# Patient Record
Sex: MALE | Race: WHITE | NOT HISPANIC OR LATINO | RURAL
[De-identification: names, ages, dates, MRNs, and addresses within clinical notes are randomized per-mention and may not be internally consistent; named-entity substitution may affect disease eponyms.]

---

## 2023-07-21 ENCOUNTER — CLINICAL SUPPORT (OUTPATIENT)
Dept: PRIMARY CARE CLINIC | Facility: CLINIC | Age: 43
End: 2023-07-21
Payer: COMMERCIAL

## 2023-07-21 DIAGNOSIS — Z01.10 ENCOUNTER FOR HEARING EXAMINATION, UNSPECIFIED WHETHER ABNORMAL FINDINGS: Primary | ICD-10-CM

## 2023-07-21 PROCEDURE — 92552 PURE TONE AUDIOMETRY AIR: CPT | Mod: ,,, | Performed by: NURSE PRACTITIONER

## 2023-07-21 PROCEDURE — 92552 PR PURE TONE AUDIOMETRY, AIR: ICD-10-PCS | Mod: ,,, | Performed by: NURSE PRACTITIONER

## 2023-07-21 NOTE — PROGRESS NOTES
Subjective     Patient ID: Vahid Butler is a 42 y.o. male.    Chief Complaint: No chief complaint on file.    HPI  Review of Systems       Objective     Physical Exam       Assessment and Plan     1. Encounter for hearing examination, unspecified whether abnormal findings        Audiogram only            No follow-ups on file.

## 2023-11-21 ENCOUNTER — OFFICE VISIT (OUTPATIENT)
Dept: FAMILY MEDICINE | Facility: CLINIC | Age: 43
End: 2023-11-21
Payer: COMMERCIAL

## 2023-11-21 VITALS
HEART RATE: 120 BPM | WEIGHT: 229.69 LBS | BODY MASS INDEX: 31.11 KG/M2 | TEMPERATURE: 101 F | SYSTOLIC BLOOD PRESSURE: 120 MMHG | RESPIRATION RATE: 18 BRPM | OXYGEN SATURATION: 96 % | HEIGHT: 72 IN | DIASTOLIC BLOOD PRESSURE: 70 MMHG

## 2023-11-21 DIAGNOSIS — J06.9 ACUTE UPPER RESPIRATORY INFECTION: ICD-10-CM

## 2023-11-21 DIAGNOSIS — J11.1 INFLUENZA: Primary | ICD-10-CM

## 2023-11-21 DIAGNOSIS — R50.9 FEVER, UNSPECIFIED FEVER CAUSE: ICD-10-CM

## 2023-11-21 LAB
CTP QC/QA: YES
FLUAV AG NPH QL: NEGATIVE
FLUBV AG NPH QL: NEGATIVE

## 2023-11-21 PROCEDURE — 1159F PR MEDICATION LIST DOCUMENTED IN MEDICAL RECORD: ICD-10-PCS | Mod: ,,, | Performed by: NURSE PRACTITIONER

## 2023-11-21 PROCEDURE — 3078F DIAST BP <80 MM HG: CPT | Mod: ,,, | Performed by: NURSE PRACTITIONER

## 2023-11-21 PROCEDURE — 3074F PR MOST RECENT SYSTOLIC BLOOD PRESSURE < 130 MM HG: ICD-10-PCS | Mod: ,,, | Performed by: NURSE PRACTITIONER

## 2023-11-21 PROCEDURE — 1159F MED LIST DOCD IN RCRD: CPT | Mod: ,,, | Performed by: NURSE PRACTITIONER

## 2023-11-21 PROCEDURE — 3074F SYST BP LT 130 MM HG: CPT | Mod: ,,, | Performed by: NURSE PRACTITIONER

## 2023-11-21 PROCEDURE — 3008F PR BODY MASS INDEX (BMI) DOCUMENTED: ICD-10-PCS | Mod: ,,, | Performed by: NURSE PRACTITIONER

## 2023-11-21 PROCEDURE — 1160F RVW MEDS BY RX/DR IN RCRD: CPT | Mod: ,,, | Performed by: NURSE PRACTITIONER

## 2023-11-21 PROCEDURE — 99203 PR OFFICE/OUTPT VISIT, NEW, LEVL III, 30-44 MIN: ICD-10-PCS | Mod: ,,, | Performed by: NURSE PRACTITIONER

## 2023-11-21 PROCEDURE — 1160F PR REVIEW ALL MEDS BY PRESCRIBER/CLIN PHARMACIST DOCUMENTED: ICD-10-PCS | Mod: ,,, | Performed by: NURSE PRACTITIONER

## 2023-11-21 PROCEDURE — 87804 INFLUENZA ASSAY W/OPTIC: CPT | Mod: QW,,, | Performed by: NURSE PRACTITIONER

## 2023-11-21 PROCEDURE — 99203 OFFICE O/P NEW LOW 30 MIN: CPT | Mod: ,,, | Performed by: NURSE PRACTITIONER

## 2023-11-21 PROCEDURE — 3008F BODY MASS INDEX DOCD: CPT | Mod: ,,, | Performed by: NURSE PRACTITIONER

## 2023-11-21 PROCEDURE — 87804 POCT INFLUENZA A/B: ICD-10-PCS | Mod: QW,,, | Performed by: NURSE PRACTITIONER

## 2023-11-21 PROCEDURE — 3078F PR MOST RECENT DIASTOLIC BLOOD PRESSURE < 80 MM HG: ICD-10-PCS | Mod: ,,, | Performed by: NURSE PRACTITIONER

## 2023-11-21 RX ORDER — OSELTAMIVIR PHOSPHATE 75 MG/1
75 CAPSULE ORAL 2 TIMES DAILY
Qty: 10 CAPSULE | Refills: 0 | Status: SHIPPED | OUTPATIENT
Start: 2023-11-21 | End: 2023-11-26

## 2023-11-21 RX ORDER — CETIRIZINE HYDROCHLORIDE, PSEUDOEPHEDRINE HYDROCHLORIDE 5; 120 MG/1; MG/1
1 TABLET, FILM COATED, EXTENDED RELEASE ORAL 2 TIMES DAILY
Qty: 24 TABLET | Refills: 0 | Status: SHIPPED | OUTPATIENT
Start: 2023-11-21

## 2023-11-21 NOTE — LETTER
November 27, 2023    Vahid Butler  [address]  [place] [address]             [facility]  Family Medicine  [address]   [place] [address]   November 27, 2023     Patient: Vahid Butler   YOB: 1980   Date of Visit: 11/21/2023       To Whom it May Concern:    Vahid Butler was seen in my clinic on 11/21/2023. He may return to work on 11/28/2023 .    Please excuse him from any classes or work missed.    If you have any questions or concerns, please don't hesitate to call.    Sincerely,         Es Warren, NP

## 2023-11-21 NOTE — PROGRESS NOTES
Subjective:       Vahid Butler is a 43 y.o. male who presents for evaluation of symptoms of a URI. Symptoms include achiness, congestion, cough described as productive, fever 101.4, and sore throat. Onset of symptoms was 1 day ago, and has been gradually worsening since that time. Treatment to date: none.    Review of Systems  Pertinent items are noted in HPI.     Objective:      /70 (BP Location: Left arm, Patient Position: Sitting, BP Method: Large (Manual))   Pulse (!) 120   Temp (!) 101.4 °F (38.6 °C) (Oral)   Resp 18   Ht 6' (1.829 m)   Wt 104.2 kg (229 lb 11.2 oz)   SpO2 96%   BMI 31.15 kg/m²   General appearance: alert, appears stated age, and cooperative  Head: Normocephalic, without obvious abnormality, atraumatic  Eyes: conjunctivae/corneas clear. PERRL, EOM's intact. Fundi benign.  Ears: abnormal TM right ear - dull and abnormal TM left ear - dull  Nose: Nares normal. Septum midline. Mucosa normal. No drainage or sinus tenderness., moderate congestion  Throat: abnormal findings: mild oropharyngeal erythema  Lungs: clear to auscultation bilaterally  Heart: regular rate and rhythm, S1, S2 normal, no murmur, click, rub or gallop  Extremities: extremities normal, atraumatic, no cyanosis or edema  Skin: Skin color, texture, turgor normal. No rashes or lesions  Lymph nodes: Cervical, supraclavicular, and axillary nodes normal.  Neurologic: Alert and oriented X 3, normal strength and tone. Normal symmetric reflexes. Normal coordination and gait     Assessment:      influenza and viral upper respiratory illness     Plan:      Discussed diagnosis and treatment of URI.  Suggested symptomatic OTC remedies.  Nasal saline spray for congestion.  Follow up as needed.

## 2023-11-21 NOTE — LETTER
November 21, 2023    Vahid Butler  [address]  [place] [address]             [facility]  Family Medicine  [address]   [place] [address]   November 21, 2023     Patient: Vahid Butler   YOB: 1980   Date of Visit: 11/21/2023       To Whom it May Concern:    Vahid Butler was seen in my clinic on 11/21/2023. He may return to work on 11/27/2023 .    Please excuse him from any classes or work missed.    If you have any questions or concerns, please don't hesitate to call.    Sincerely,         Es Warren, NP

## 2024-05-06 DIAGNOSIS — M25.511 ACUTE PAIN OF BOTH SHOULDERS: Primary | ICD-10-CM

## 2024-05-06 DIAGNOSIS — M25.512 ACUTE PAIN OF BOTH SHOULDERS: Primary | ICD-10-CM

## 2024-05-08 ENCOUNTER — OFFICE VISIT (OUTPATIENT)
Dept: ORTHOPEDICS | Facility: CLINIC | Age: 44
End: 2024-05-08
Payer: COMMERCIAL

## 2024-05-08 ENCOUNTER — HOSPITAL ENCOUNTER (OUTPATIENT)
Dept: RADIOLOGY | Facility: HOSPITAL | Age: 44
Discharge: HOME | End: 2024-05-08
Attending: ORTHOPAEDIC SURGERY
Payer: COMMERCIAL

## 2024-05-08 DIAGNOSIS — M25.511 BILATERAL SHOULDER PAIN, UNSPECIFIED CHRONICITY: Primary | ICD-10-CM

## 2024-05-08 DIAGNOSIS — M25.511 ACUTE PAIN OF BOTH SHOULDERS: ICD-10-CM

## 2024-05-08 DIAGNOSIS — M25.512 BILATERAL SHOULDER PAIN, UNSPECIFIED CHRONICITY: Primary | ICD-10-CM

## 2024-05-08 DIAGNOSIS — M25.819 IMPINGEMENT OF SHOULDER: ICD-10-CM

## 2024-05-08 DIAGNOSIS — M25.512 ACUTE PAIN OF BOTH SHOULDERS: ICD-10-CM

## 2024-05-08 PROCEDURE — 20610 DRAIN/INJ JOINT/BURSA W/O US: CPT | Mod: PBBFAC,RT | Performed by: ORTHOPAEDIC SURGERY

## 2024-05-08 PROCEDURE — 99203 OFFICE O/P NEW LOW 30 MIN: CPT | Mod: S$PBB,25,, | Performed by: ORTHOPAEDIC SURGERY

## 2024-05-08 PROCEDURE — 1159F MED LIST DOCD IN RCRD: CPT | Mod: ,,, | Performed by: ORTHOPAEDIC SURGERY

## 2024-05-08 PROCEDURE — 99999PBSHW PR PBB SHADOW TECHNICAL ONLY FILED TO HB: Mod: PBBFAC,,,

## 2024-05-08 PROCEDURE — 73030 X-RAY EXAM OF SHOULDER: CPT | Mod: TC,50

## 2024-05-08 PROCEDURE — 99213 OFFICE O/P EST LOW 20 MIN: CPT | Mod: PBBFAC,25 | Performed by: ORTHOPAEDIC SURGERY

## 2024-05-08 PROCEDURE — 73030 X-RAY EXAM OF SHOULDER: CPT | Mod: 26,,, | Performed by: ORTHOPAEDIC SURGERY

## 2024-05-08 RX ORDER — TRIAMCINOLONE ACETONIDE 40 MG/ML
40 INJECTION, SUSPENSION INTRA-ARTICULAR; INTRAMUSCULAR
Status: DISCONTINUED | OUTPATIENT
Start: 2024-05-08 | End: 2024-05-08 | Stop reason: HOSPADM

## 2024-05-08 RX ORDER — BUPIVACAINE HYDROCHLORIDE 2.5 MG/ML
1 INJECTION, SOLUTION EPIDURAL; INFILTRATION; INTRACAUDAL
Status: DISCONTINUED | OUTPATIENT
Start: 2024-05-08 | End: 2024-05-08 | Stop reason: HOSPADM

## 2024-05-08 RX ADMIN — TRIAMCINOLONE ACETONIDE 40 MG: 40 INJECTION, SUSPENSION INTRA-ARTICULAR; INTRAMUSCULAR at 10:05

## 2024-05-08 RX ADMIN — BUPIVACAINE HYDROCHLORIDE 1 ML: 2.5 INJECTION, SOLUTION EPIDURAL; INFILTRATION; INTRACAUDAL at 10:05

## 2024-05-08 NOTE — PROGRESS NOTES
Clinic Note        CC:   Chief Complaint   Patient presents with    Left Shoulder - Pain    Right Shoulder - Pain        Principal problem: Bilateral shoulder pain, unspecified chronicity [M25.511, M25.512]     REASON FOR VISIT:       HISTORY:  43-year-old male complaining of bilateral shoulder pain left greater than right he has pain when he brings it across his body tries reach behind his back he is work for long time reaching overhead he has pain when he brings her overhead also wakes him up at night.      PAST MEDICAL HISTORY: History reviewed. No pertinent past medical history.       PAST SURGICAL HISTORY: History reviewed. No pertinent surgical history.       ALLERGIES: Review of patient's allergies indicates:  No Known Allergies     MEDICATIONS :  No current outpatient medications on file.     SOCIAL HISTORY:   Social History     Socioeconomic History    Marital status:    Tobacco Use    Smoking status: Never    Smokeless tobacco: Never   Substance and Sexual Activity    Alcohol use: Yes     Comment: rarely    Drug use: Never          FAMILY HISTORY: No family history on file.       PHYSICAL EXAM:  In general, this is a well-developed, well-nourished male . The patient is alert, oriented and cooperative.      HEENT:  Normocephalic, atraumatic.  Extraocular movements are intact bilaterally.  The oropharynx is benign.       NECK:  Nontender with good range of motion.      LUNGS:  Clear to auscultation bilaterally.      HEART:  Demonstrates a regular rate and rhythm.  No murmurs appreciated.      ABDOMEN:  Soft, non-tender, non-distended.        EXTREMITIES:  Bilateral upper extremities he has motor function 5/5 sensation to touch has palpable pulses tender palpation over his anterolateral acromion over his AC joint pain on impingement testing pain on crossed adduction testing some pain on Solorzano and Maty testing.      RADIOGRAPHIC FINDINGS:  X-rays show no fracture subluxation bilateral  shoulders he has some degenerative changes AC joint some downsloping of the acromion.      IMPRESSION: Bilateral shoulder pain, unspecified chronicity         PLAN:  Patient has impingement bilaterally.  We discussed treatment options I injected his left shoulder with 1 cc Marcaine 1 cc Kenalog.  He was taught exercises.  He is going to go to outpatient therapy.  I will follow back up in 2 months.  There are no Patient Instructions on file for this visit.      No follow-ups on file.         Caio Murrieta      (Subject to voice recognition error, transcription service not allowed)

## 2024-05-08 NOTE — PROGRESS NOTES
Radiology Interpretation        Patient Name: Vahid Butler  Date: 5/8/2024  YOB: 1980  MRN# [id number]        ORDERING DIAGNOSIS:    Encounter Diagnosis   Name Primary?    Bilateral shoulder pain, unspecified chronicity Yes      Two views AP lateral right shoulder skeletally mature individual there is some degenerative changes AC joint downsloping lateral acromion no fractures impression degenerative changes AC joint right shoulder                 Caio Murrieta MD

## 2024-05-08 NOTE — PROCEDURES
Large Joint Aspiration/Injection: L subacromial bursa    Date/Time: 5/8/2024 10:50 AM    Performed by: Caio Murrieta MD  Authorized by: Caio Murrieta MD    Consent Done?:  Yes (Verbal)  Indications:  Pain    Details:  Needle Size:  22 G  Ultrasonic Guidance for needle placement?: No    Approach:  Posterior  Location:  Shoulder  Site:  L subacromial bursa  Medications:  1 mL BUPivacaine (PF) 0.25% (2.5 mg/ml) 0.25 % (2.5 mg/mL); 40 mg triamcinolone acetonide 40 mg/mL  Patient tolerance:  Patient tolerated the procedure well with no immediate complications

## 2024-05-08 NOTE — PROGRESS NOTES
Radiology Interpretation        Patient Name: Vahid Butler  Date: 5/8/2024  YOB: 1980  MRN# [id number]        ORDERING DIAGNOSIS:    Encounter Diagnosis   Name Primary?    Bilateral shoulder pain, unspecified chronicity Yes      Two views AP lateral left shoulder skeletally mature individual there are degenerative changes AC joint some downsloping lateral acromion no fractures no bony lesions impression mild degenerative changes AC joint left shoulder                 Caio Murrieta MD